# Patient Record
Sex: FEMALE | Race: WHITE | NOT HISPANIC OR LATINO | Employment: UNEMPLOYED | URBAN - METROPOLITAN AREA
[De-identification: names, ages, dates, MRNs, and addresses within clinical notes are randomized per-mention and may not be internally consistent; named-entity substitution may affect disease eponyms.]

---

## 2017-09-27 LAB — NONINV COLON CA DNA+OCC BLD SCRN STL QL: NEGATIVE

## 2018-11-20 LAB
CHOLEST SERPL-MCNC: 239 MG/DL (ref 0–200)
HDL/CHOLESTEROL RATIO: 3.3 (ref 0–5)
HDLC SERPL-MCNC: 72 MG/DL
LDLC SERPL CALC-MCNC: 149 MG/DL
NON HDL CHOL (CALC): 167 (ref 0–130)
TRIGL SERPL-MCNC: 74 MG/DL (ref 0–150)

## 2020-08-18 ENCOUNTER — OFFICE VISIT (OUTPATIENT)
Dept: PRIMARY CARE CLINIC | Facility: CLINIC | Age: 70
End: 2020-08-18
Payer: MEDICARE

## 2020-08-18 VITALS
TEMPERATURE: 98 F | HEART RATE: 85 BPM | BODY MASS INDEX: 30.4 KG/M2 | DIASTOLIC BLOOD PRESSURE: 82 MMHG | SYSTOLIC BLOOD PRESSURE: 136 MMHG | HEIGHT: 61 IN | WEIGHT: 161 LBS | OXYGEN SATURATION: 96 %

## 2020-08-18 DIAGNOSIS — M79.662 PAIN OF LEFT CALF: Primary | ICD-10-CM

## 2020-08-18 DIAGNOSIS — R60.0 LEG EDEMA, LEFT: Primary | ICD-10-CM

## 2020-08-18 PROCEDURE — 99213 OFFICE O/P EST LOW 20 MIN: CPT | Mod: S$GLB,,, | Performed by: NURSE PRACTITIONER

## 2020-08-18 PROCEDURE — 99213 PR OFFICE/OUTPT VISIT, EST, LEVL III, 20-29 MIN: ICD-10-PCS | Mod: S$GLB,,, | Performed by: NURSE PRACTITIONER

## 2020-08-18 RX ORDER — FLUTICASONE PROPIONATE 50 MCG
2 SPRAY, SUSPENSION (ML) NASAL NIGHTLY
COMMUNITY

## 2020-08-18 RX ORDER — FLUTICASONE PROPIONATE AND SALMETEROL 500; 50 UG/1; UG/1
1 POWDER RESPIRATORY (INHALATION) 2 TIMES DAILY
COMMUNITY

## 2020-08-18 RX ORDER — AZELASTINE 1 MG/ML
2 SPRAY, METERED NASAL 2 TIMES DAILY
COMMUNITY

## 2020-08-18 RX ORDER — LORATADINE 10 MG/1
10 TABLET ORAL DAILY
COMMUNITY

## 2020-08-18 NOTE — PROGRESS NOTES
"Subjective:      Patient ID: Kenyetta Mota is a 69 y.o. female.    Chief Complaint: Leg Pain (left leg -calf swollen -no redness or warmth), Annual Exam, and Hip Pain (sciatic nerve)    HPI Kenyetta Mota is a 69 y.o. female who presents today for left leg pain, reports woke with left front thigh constant pain rating 4-5/10- relieved with tylenol onset one week. No pain at present. Reports leg calf swelling, onset 5 days ago. Also had posterior knee pain for short time. One week prior had severe leg cramp at night. Known left knee OA. Denies history of DVT.      Review of Systems   Constitutional: Negative for fever.   Respiratory: Negative for cough and shortness of breath.    Cardiovascular: Positive for leg swelling. Negative for chest pain and palpitations.   Musculoskeletal: Positive for myalgias.         Objective:     /82 Comment: 13  Pulse 85   Temp 97.8 °F (36.6 °C) (Skin)   Ht 5' 1" (1.549 m)   Wt 73 kg (161 lb)   SpO2 96%   BMI 30.42 kg/m²   Estimated body mass index is 30.42 kg/m² as calculated from the following:    Height as of this encounter: 5' 1" (1.549 m).    Weight as of this encounter: 73 kg (161 lb).  Physical Exam  Vitals signs and nursing note reviewed.   Constitutional:       Appearance: Normal appearance.   Cardiovascular:      Rate and Rhythm: Normal rate and regular rhythm.      Pulses: Normal pulses.      Heart sounds: Normal heart sounds.   Pulmonary:      Effort: Pulmonary effort is normal.      Breath sounds: Normal breath sounds.   Abdominal:      General: Bowel sounds are normal.      Palpations: Abdomen is soft.   Musculoskeletal:         General: Swelling (left calf edema, 41 cm diameter, old surgical scar anterior LLE from bone graft, minimal veining) present.      Comments: Neg Evie's sign, no calf tenderness   Skin:     General: Skin is warm and dry.      Findings: No erythema.   Neurological:      Mental Status: She is alert and oriented to person, place, and " time.   Psychiatric:         Mood and Affect: Mood normal.         Behavior: Behavior normal.         Thought Content: Thought content normal.         Judgment: Judgment normal.       Assessment and Plan:     Problem List Items Addressed This Visit     None      Visit Diagnoses     Pain of left calf    -  Primary    STAT u/s  start asa 325 mg po qd    Relevant Orders    CBC auto differential    Comprehensive metabolic panel    US Lower Extremity Veins Left    D dimer, quantitative       Pain of left calf  Comments:  STAT u/s  start asa 325 mg po qd  Orders:  -     CBC auto differential; Future; Expected date: 08/18/2020  -     Comprehensive metabolic panel; Future; Expected date: 08/18/2020  -     US Lower Extremity Veins Left; Future; Expected date: 08/18/2020  -     D dimer, quantitative; Future; Expected date: 08/18/2020          DISCUSSION:    DVT PRECAUTIONS  STAT LLE u/s to r/o DVT   lab studies  Start  mg qd    COVID precautions.  The above plan of care discussed with patient. All questions were answered.

## 2020-08-19 ENCOUNTER — PATIENT OUTREACH (OUTPATIENT)
Dept: ADMINISTRATIVE | Facility: HOSPITAL | Age: 70
End: 2020-08-19

## 2020-08-19 LAB
ALBUMIN SERPL-MCNC: 4.6 G/DL (ref 3.6–5.1)
ALBUMIN/GLOB SERPL: 1.7 (CALC) (ref 1–2.5)
ALP SERPL-CCNC: 64 U/L (ref 37–153)
ALT SERPL-CCNC: 27 U/L (ref 6–29)
AST SERPL-CCNC: 24 U/L (ref 10–35)
BASOPHILS # BLD AUTO: 50 CELLS/UL (ref 0–200)
BASOPHILS NFR BLD AUTO: 0.7 %
BILIRUB SERPL-MCNC: 0.4 MG/DL (ref 0.2–1.2)
BUN SERPL-MCNC: 15 MG/DL (ref 7–25)
BUN/CREAT SERPL: NORMAL (CALC) (ref 6–22)
CALCIUM SERPL-MCNC: 9.8 MG/DL (ref 8.6–10.4)
CHLORIDE SERPL-SCNC: 102 MMOL/L (ref 98–110)
CO2 SERPL-SCNC: 27 MMOL/L (ref 20–32)
CREAT SERPL-MCNC: 0.78 MG/DL (ref 0.5–0.99)
D DIMER PPP FEU-MCNC: 0.28 MCG/ML FEU
EOSINOPHIL # BLD AUTO: 71 CELLS/UL (ref 15–500)
EOSINOPHIL NFR BLD AUTO: 1 %
ERYTHROCYTE [DISTWIDTH] IN BLOOD BY AUTOMATED COUNT: 12.4 % (ref 11–15)
GFRSERPLBLD MDRD-ARVRAT: 78 ML/MIN/1.73M2
GLOBULIN SER CALC-MCNC: 2.7 G/DL (CALC) (ref 1.9–3.7)
GLUCOSE SERPL-MCNC: 81 MG/DL (ref 65–99)
HCT VFR BLD AUTO: 43.8 % (ref 35–45)
HGB BLD-MCNC: 14.1 G/DL (ref 11.7–15.5)
LYMPHOCYTES # BLD AUTO: 2087 CELLS/UL (ref 850–3900)
LYMPHOCYTES NFR BLD AUTO: 29.4 %
MCH RBC QN AUTO: 30.7 PG (ref 27–33)
MCHC RBC AUTO-ENTMCNC: 32.2 G/DL (ref 32–36)
MCV RBC AUTO: 95.4 FL (ref 80–100)
MONOCYTES # BLD AUTO: 540 CELLS/UL (ref 200–950)
MONOCYTES NFR BLD AUTO: 7.6 %
NEUTROPHILS # BLD AUTO: 4352 CELLS/UL (ref 1500–7800)
NEUTROPHILS NFR BLD AUTO: 61.3 %
PLATELET # BLD AUTO: 303 THOUSAND/UL (ref 140–400)
PMV BLD REES-ECKER: 9.7 FL (ref 7.5–12.5)
POTASSIUM SERPL-SCNC: 4.6 MMOL/L (ref 3.5–5.3)
PROT SERPL-MCNC: 7.3 G/DL (ref 6.1–8.1)
RBC # BLD AUTO: 4.59 MILLION/UL (ref 3.8–5.1)
SODIUM SERPL-SCNC: 139 MMOL/L (ref 135–146)
WBC # BLD AUTO: 7.1 THOUSAND/UL (ref 3.8–10.8)

## 2020-08-19 NOTE — LETTER
August 19, 2020    [employer] [facility]           [facility]  [address]  [facility] [address]  Phone: [phone] August 19, 2020     Patient: Kenyetta Mota    YOB: 1950   Date of Visit: 8/19/2020       To whom it may concern:     We are seeing Kenyetta Mota, YOB: 1950, at [facility]. Karen Uribe NP is their primary care physician. To help with our [facility] maintenance records could you please send the following:     Most recent Cologaurd Result    Please send fax to [phone] e-mail to [email or web address], Attention Genesis ROWAN LPN Care Coordination.    Thank-you in advance for your assistance. If you have any questions or concerns, please don't hesitate to contact me at [phone].     Genesis ROWAN LPN  Care Coordination Department  [facility]

## 2020-08-19 NOTE — PROGRESS NOTES
Please see documentation below.    Attempting to contact pt to schedule overdue HM. Unable to reach patient at this time. Left voicemail.     Health Maintenance Updated.   Immunizations: Abstracted.  Care Everywhere: Abstracted: Results Updated.Lipid Panel. Modifier updated. Uses Cologaurd . Report not in chart  Care Team: Updated/Reviewed  [facility] Search: Pt not found. No results.  Pathology Lab:Pt not found. No results  [facility] Checked-  Results uploaded -DEXA uploaded. Last mammogram was ordered by Dr. Daniels. Not in chart  Media Checked- No results   Imaging Checked: No results       Health Maintenance Due   Topic    Hepatitis C Screening     Mammogram :Req result    Shingles Vaccine (1 of 2)    Colorectal Cancer Screening :Req result    TETANUS VACCINE     Pneumococcal Vaccine (65+ Low/Medium Risk) (2 of 2 - PPSV23)    Lipid Panel :Due    DEXA SCAN :Uploaded   Fax form sent for Cologaurd result.  Fax form sent for Mammogram Result.    Annual Wellness Visit Scheduled : Eligible   Manually entered labs.  Statin: On no medication

## 2020-12-28 NOTE — LETTER
August 19, 2020             [facility]  [address]  [facility] [address]  Phone: [phone] August 19, 2020     Patient: Kenyetta Mota    YOB: 1950   Date of Visit: 8/19/2020       To whom it may concern       We are seeing Kenyetta Mota, YOB: 1950, at [facility]. Karen Uribe NP is their primary care physician. To help with our [facility] maintenance records could you please send the following:     Most recent Mammogram Result.    Please send fax to [phone] or e-mail to [email or web address], Attention Genesis ROWAN LPN Care Coordination.    Thank-you in advance for your assistance. If you have any questions or concerns, please don't hesitate to contact me at [phone].     Genesis REVELES LPN  Care Coordination Department  [facility]      Attending Only

## 2021-07-15 ENCOUNTER — PATIENT OUTREACH (OUTPATIENT)
Dept: ADMINISTRATIVE | Facility: HOSPITAL | Age: 71
End: 2021-07-15

## 2021-07-22 ENCOUNTER — PATIENT OUTREACH (OUTPATIENT)
Dept: ADMINISTRATIVE | Facility: HOSPITAL | Age: 71
End: 2021-07-22

## 2022-03-21 ENCOUNTER — TELEPHONE (OUTPATIENT)
Dept: OBSTETRICS AND GYNECOLOGY | Facility: CLINIC | Age: 72
End: 2022-03-21
Payer: MEDICARE

## 2022-04-06 ENCOUNTER — OFFICE VISIT (OUTPATIENT)
Dept: OBSTETRICS AND GYNECOLOGY | Facility: CLINIC | Age: 72
End: 2022-04-06
Payer: MEDICARE

## 2022-04-06 VITALS
DIASTOLIC BLOOD PRESSURE: 77 MMHG | BODY MASS INDEX: 28.7 KG/M2 | HEIGHT: 61 IN | HEART RATE: 103 BPM | WEIGHT: 152 LBS | SYSTOLIC BLOOD PRESSURE: 146 MMHG

## 2022-04-06 DIAGNOSIS — L90.0 LICHEN SCLEROSUS: ICD-10-CM

## 2022-04-06 DIAGNOSIS — Z12.31 SCREENING MAMMOGRAM, ENCOUNTER FOR: ICD-10-CM

## 2022-04-06 DIAGNOSIS — Z12.11 SCREENING FOR COLON CANCER: ICD-10-CM

## 2022-04-06 DIAGNOSIS — Z01.419 ENCOUNTER FOR ROUTINE GYNECOLOGIC EXAMINATION IN MEDICARE PATIENT: Primary | ICD-10-CM

## 2022-04-06 PROCEDURE — G0101 PR CA SCREEN;PELVIC/BREAST EXAM: ICD-10-PCS | Mod: S$GLB,,, | Performed by: OBSTETRICS & GYNECOLOGY

## 2022-04-06 PROCEDURE — G0101 CA SCREEN;PELVIC/BREAST EXAM: HCPCS | Mod: S$GLB,,, | Performed by: OBSTETRICS & GYNECOLOGY

## 2022-04-06 RX ORDER — CLOBETASOL PROPIONATE 0.5 MG/G
OINTMENT TOPICAL 2 TIMES DAILY PRN
Qty: 15 G | Refills: 0 | Status: SHIPPED | OUTPATIENT
Start: 2022-04-06

## 2022-04-06 NOTE — PROGRESS NOTES
Kenyetta Mota is a 71 y.o. female  who presents for a well woman exam.  She notes itching at the perineum.     Past Medical History:   Diagnosis Date    Allergy     Arthritis     Asthma     Cataracts, bilateral     COPD (chronic obstructive pulmonary disease)     Hyperlipidemia        Past Surgical History:   Procedure Laterality Date    ADENOIDECTOMY      CERVICAL FUSION      FRACTURE SURGERY      TONSILLECTOMY         OB History    Para Term  AB Living   3 3       3   SAB IAB Ectopic Multiple Live Births                  # Outcome Date GA Lbr Kings/2nd Weight Sex Delivery Anes PTL Lv   3 Para            2 Para            1 Para                Family History   Problem Relation Age of Onset    Arthritis Mother     Arthritis Maternal Aunt     Heart disease Maternal Aunt     Arthritis Maternal Grandmother     Arthritis Maternal Grandfather        Social History     Tobacco Use    Smoking status: Never Smoker    Smokeless tobacco: Never Used   Substance Use Topics    Alcohol use: Yes     Comment: socially    Drug use: Never         Current Outpatient Medications:     azelastine (ASTELIN) 137 mcg (0.1 %) nasal spray, 2 sprays by Nasal route 2 (two) times daily., Disp: , Rfl:     fluticasone propionate (FLONASE) 50 mcg/actuation nasal spray, 2 sprays by Each Nostril route every evening., Disp: , Rfl:     fluticasone-salmeterol diskus inhaler 500-50 mcg, Inhale 1 puff into the lungs 2 (two) times daily. Controller, Disp: , Rfl:     clobetasol 0.05% (TEMOVATE) 0.05 % Oint, Apply topically 2 (two) times daily as needed., Disp: 15 g, Rfl: 0    loratadine (CLARITIN) 10 mg tablet, Take 10 mg by mouth once daily., Disp: , Rfl:      Review of patient's allergies indicates:  No Known Allergies     ROS:  GENERAL: Denies weight gain or weight loss. Feeling well overall.   SKIN: Denies rash or lesions.   HEAD: Denies head injury or headache.   NODES: Denies enlarged lymph nodes.   CHEST:  "Denies shortness of breath.   CARDIOVASCULAR: Denies palpitations or chest pain.   ABDOMEN: Denies abdominal pain, constipation, diarrhea, nausea, vomiting or rectal bleeding.   URINARY: Denies frequency, dysuria, hematuria, or burning on urination.  REPRODUCTIVE: See HPI.   BREASTS: Denies pain, lumps, or nipple discharge.   HEMATOLOGIC: Denies easy bruisability or excessive bleeding.  MUSCULOSKELETAL: Denies joint pain or swelling.   NEUROLOGIC: Denies syncope or weakness.   PSYCHIATRIC: Denies depression, anxiety or mood swings.    PHYSICAL EXAM:    BP (!) 146/77   Pulse 103   Ht 5' 1" (1.549 m)   Wt 68.9 kg (152 lb)   BMI 28.72 kg/m²    Body mass index is 28.72 kg/m².     APPEARANCE: Well nourished, well developed, in no acute distress.  AFFECT: WNL, alert and oriented x 3  SKIN: No acne or hirsutism  NECK: Neck symmetric without masses or thyromegaly  NODES: No inguinal, cervical, axillary, or femoral lymph node enlargement  CHEST: Good respiratory effect  ABDOMEN: Soft.  No tenderness or masses.  No hepatosplenomegaly.  No hernias.  BREASTS: Symmetrical, no skin changes or visible lesions.  No palpable masses, nipple discharge bilaterally.  PELVIC:Perineum with area of pale, thin skin consistent with lichen sclerosus.  Normal hair distribution.  Adequate perineal body, normal urethral meatus.  Urethra and bladder without tenderness or masses. Vagina moist and well rugated without lesions or discharge.  Cervix pink, without lesions, discharge or tenderness.  No significant cystocele or rectocele.  Bimanual exam shows uterus to be normal size, regular, mobile and nontender.  Adnexa without masses or tenderness.    EXTREMITIES: No edema.     Encounter for routine gynecologic examination in Medicare patient    Screening mammogram, encounter for  -     Mammo Digital Screening Bilat w/ Giovani; Future    Screening for colon cancer  -     Cologuard Screening (Multitarget Stool DNA); Future; Expected date: " 04/06/2022    Lichen sclerosus  -     clobetasol 0.05% (TEMOVATE) 0.05 % Oint; Apply topically 2 (two) times daily as needed.  Dispense: 15 g; Refill: 0         Patient was counseled today on A.C.S. Pap guidelines and recommendations for yearly pelvic exams, mammograms and monthly self breast exams; to see her PCP for other health maintenance.     Follow up in 1 year

## 2022-04-29 ENCOUNTER — TELEPHONE (OUTPATIENT)
Dept: OBSTETRICS AND GYNECOLOGY | Facility: CLINIC | Age: 72
End: 2022-04-29
Payer: MEDICARE

## 2022-04-29 NOTE — TELEPHONE ENCOUNTER
----- Message from Isa Magana sent at 4/29/2022  9:20 AM CDT -----  Contact: Patient  Patient need the nurse to call her regarding a RX Dr Daniels gave her      Call back #  [phone]

## 2022-04-29 NOTE — TELEPHONE ENCOUNTER
Patient said that she used the clobetasol ointment for two weeks and the vaginal itching went away. The itching has returned and was told if it returned she may need a biopsy.  She wanted to know if it was okay to use the ointment till her appointment on May 12th   Please Advise

## 2022-05-02 ENCOUNTER — TELEPHONE (OUTPATIENT)
Dept: OBSTETRICS AND GYNECOLOGY | Facility: CLINIC | Age: 72
End: 2022-05-02
Payer: MEDICARE

## 2022-05-02 NOTE — TELEPHONE ENCOUNTER
Patient notified that it was ok to keep using the clobetasol ointment till appt on the 12th of May

## 2022-05-10 LAB — NONINV COLON CA DNA+OCC BLD SCRN STL QL: NEGATIVE

## 2022-05-12 ENCOUNTER — OFFICE VISIT (OUTPATIENT)
Dept: OBSTETRICS AND GYNECOLOGY | Facility: CLINIC | Age: 72
End: 2022-05-12
Payer: MEDICARE

## 2022-05-12 VITALS
BODY MASS INDEX: 28.92 KG/M2 | DIASTOLIC BLOOD PRESSURE: 72 MMHG | HEIGHT: 61 IN | SYSTOLIC BLOOD PRESSURE: 145 MMHG | WEIGHT: 153.19 LBS

## 2022-05-12 DIAGNOSIS — L90.0 LICHEN SCLEROSUS: ICD-10-CM

## 2022-05-12 DIAGNOSIS — Z01.419 ENCOUNTER FOR ROUTINE GYNECOLOGIC EXAMINATION IN MEDICARE PATIENT: Primary | ICD-10-CM

## 2022-05-12 PROCEDURE — G0101 CA SCREEN;PELVIC/BREAST EXAM: HCPCS | Mod: S$GLB,,, | Performed by: OBSTETRICS & GYNECOLOGY

## 2022-05-12 PROCEDURE — G0101 PR CA SCREEN;PELVIC/BREAST EXAM: ICD-10-PCS | Mod: S$GLB,,, | Performed by: OBSTETRICS & GYNECOLOGY

## 2022-05-12 NOTE — PROGRESS NOTES
Kenyetta Mota is a 71 y.o. female  who presents for a well woman exam.  She has no issues, problems, or complaints. Vaginal itching has improved significantly with clobetasol prn.    Past Medical History:   Diagnosis Date    Allergy     Arthritis     Asthma     Cataracts, bilateral     COPD (chronic obstructive pulmonary disease)     Hyperlipidemia        Past Surgical History:   Procedure Laterality Date    ADENOIDECTOMY      CERVICAL FUSION      FRACTURE SURGERY      TONSILLECTOMY         OB History    Para Term  AB Living   3 3       3   SAB IAB Ectopic Multiple Live Births                  # Outcome Date GA Lbr Kings/2nd Weight Sex Delivery Anes PTL Lv   3 Para            2 Para            1 Para                Family History   Problem Relation Age of Onset    Arthritis Mother     Arthritis Maternal Aunt     Heart disease Maternal Aunt     Arthritis Maternal Grandmother     Arthritis Maternal Grandfather        Social History     Tobacco Use    Smoking status: Never Smoker    Smokeless tobacco: Never Used   Substance Use Topics    Alcohol use: Yes     Comment: socially    Drug use: Never         Current Outpatient Medications:     azelastine (ASTELIN) 137 mcg (0.1 %) nasal spray, 2 sprays by Nasal route 2 (two) times daily., Disp: , Rfl:     clobetasol 0.05% (TEMOVATE) 0.05 % Oint, Apply topically 2 (two) times daily as needed., Disp: 15 g, Rfl: 0    fluticasone propionate (FLONASE) 50 mcg/actuation nasal spray, 2 sprays by Each Nostril route every evening., Disp: , Rfl:     fluticasone-salmeterol diskus inhaler 500-50 mcg, Inhale 1 puff into the lungs 2 (two) times daily. Controller, Disp: , Rfl:     loratadine (CLARITIN) 10 mg tablet, Take 10 mg by mouth once daily., Disp: , Rfl:      Review of patient's allergies indicates:  No Known Allergies     ROS:  GENERAL: Denies weight gain or weight loss. Feeling well overall.   SKIN: Denies rash or lesions.   HEAD: Denies  "head injury or headache.   NODES: Denies enlarged lymph nodes.   CHEST: Denies shortness of breath.   CARDIOVASCULAR: Denies palpitations or chest pain.   ABDOMEN: Denies abdominal pain, constipation, diarrhea, nausea, vomiting or rectal bleeding.   URINARY: Denies frequency, dysuria, hematuria, or burning on urination.  REPRODUCTIVE: See HPI.   BREASTS: Denies pain, lumps, or nipple discharge.   HEMATOLOGIC: Denies easy bruisability or excessive bleeding.  MUSCULOSKELETAL: Denies joint pain or swelling.   NEUROLOGIC: Denies syncope or weakness.   PSYCHIATRIC: Denies depression, anxiety or mood swings.    PHYSICAL EXAM:    BP (!) 145/72   Ht 5' 1" (1.549 m)   Wt 69.5 kg (153 lb 3.2 oz)   BMI 28.95 kg/m²    Body mass index is 28.95 kg/m².     APPEARANCE: Well nourished, well developed, in no acute distress.  AFFECT: WNL, alert and oriented x 3  SKIN: No acne or hirsutism  NECK: Neck symmetric without masses or thyromegaly  NODES: No inguinal, cervical, axillary, or femoral lymph node enlargement  CHEST: Good respiratory effect  ABDOMEN: Soft.  No tenderness or masses.  No hepatosplenomegaly.  No hernias.  BREASTS: Symmetrical, no skin changes or visible lesions.  No palpable masses, nipple discharge bilaterally.  PELVIC: Normal external genitalia without lesions.  Normal hair distribution.  Adequate perineal body, normal urethral meatus.  Urethra and bladder without tenderness or masses. Vagina moist and well rugated without lesions or discharge.  Cervix pink, without lesions, discharge or tenderness.  No significant cystocele or rectocele.  Bimanual exam shows uterus to be normal size, regular, mobile and nontender.  Adnexa without masses or tenderness.    EXTREMITIES: No edema.     Encounter for routine gynecologic examination in Medicare patient  -     Liquid-based pap smear, screening    Lichen sclerosus       Continue clobetasol prn  Patient was counseled today on A.C.S. Pap guidelines and recommendations for " yearly pelvic exams, mammograms and monthly self breast exams; to see her PCP for other health maintenance.     Follow up in 1 year

## 2022-06-06 ENCOUNTER — TELEPHONE (OUTPATIENT)
Dept: OBSTETRICS AND GYNECOLOGY | Facility: CLINIC | Age: 72
End: 2022-06-06
Payer: MEDICARE

## 2022-06-06 NOTE — TELEPHONE ENCOUNTER
----- Message from Azul Silva sent at 6/6/2022  9:21 AM CDT -----  Contact: self  Requesting a call back regarding pap results of pap test. Please call back at [phone]

## 2023-05-04 ENCOUNTER — TELEPHONE (OUTPATIENT)
Dept: ADMINISTRATIVE | Facility: OTHER | Age: 73
End: 2023-05-04
Payer: MEDICARE